# Patient Record
Sex: FEMALE | Race: WHITE | Employment: FULL TIME | URBAN - METROPOLITAN AREA
[De-identification: names, ages, dates, MRNs, and addresses within clinical notes are randomized per-mention and may not be internally consistent; named-entity substitution may affect disease eponyms.]

---
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
## 2018-04-19 ENCOUNTER — TELEPHONE (OUTPATIENT)
Dept: FAMILY MEDICINE | Facility: CLINIC | Age: 18
End: 2018-04-19

## 2018-04-19 ENCOUNTER — OFFICE VISIT (OUTPATIENT)
Dept: FAMILY MEDICINE | Facility: CLINIC | Age: 18
End: 2018-04-19
Payer: COMMERCIAL

## 2018-04-19 VITALS
HEART RATE: 80 BPM | TEMPERATURE: 98.8 F | DIASTOLIC BLOOD PRESSURE: 82 MMHG | BODY MASS INDEX: 37.38 KG/M2 | WEIGHT: 211 LBS | SYSTOLIC BLOOD PRESSURE: 120 MMHG | OXYGEN SATURATION: 100 %

## 2018-04-19 DIAGNOSIS — L21.9 SEBORRHEIC DERMATITIS: ICD-10-CM

## 2018-04-19 DIAGNOSIS — R21 RASH: Primary | ICD-10-CM

## 2018-04-19 LAB
KOH PREP SPEC: NORMAL
SPECIMEN SOURCE: NORMAL

## 2018-04-19 PROCEDURE — 87220 TISSUE EXAM FOR FUNGI: CPT | Performed by: INTERNAL MEDICINE

## 2018-04-19 PROCEDURE — 99213 OFFICE O/P EST LOW 20 MIN: CPT | Performed by: INTERNAL MEDICINE

## 2018-04-19 RX ORDER — FLUOCINONIDE TOPICAL SOLUTION USP, 0.05% 0.5 MG/ML
SOLUTION TOPICAL
Qty: 60 ML | Refills: 0 | Status: SHIPPED | OUTPATIENT
Start: 2018-04-19 | End: 2021-03-16

## 2018-04-19 RX ORDER — BETAMETHASONE DIPROPIONATE 0.5 MG/G
LOTION TOPICAL
Qty: 30 ML | Refills: 3 | Status: SHIPPED | OUTPATIENT
Start: 2018-04-19 | End: 2021-03-16

## 2018-04-19 RX ORDER — KETOCONAZOLE 20 MG/ML
SHAMPOO TOPICAL DAILY PRN
Qty: 100 ML | Refills: 3 | Status: SHIPPED | OUTPATIENT
Start: 2018-04-19 | End: 2021-03-16

## 2018-04-19 NOTE — MR AVS SNAPSHOT
"              After Visit Summary   2018    Felisha Santillan    MRN: [id number]           Patient Information     Date Of Birth          2000        Visit Information        Provider Department      2018 12:40 PM Heather Bentley MD [facility]        Today's Diagnoses     Rash    -  1    Seborrheic dermatitis           Follow-ups after your visit        Follow-up notes from your care team     Return if symptoms worsen or fail to improve.      Who to contact     If you have questions or need follow up information about today's clinic visit or your schedule please contact [facility] directly at [phone].  Normal or non-critical lab and imaging results will be communicated to you by MyChart, letter or phone within 4 business days after the clinic has received the results. If you do not hear from us within 7 days, please contact the clinic through MyChart or phone. If you have a critical or abnormal lab result, we will notify you by phone as soon as possible.  Submit refill requests through [employer] or call your pharmacy and they will forward the refill request to us. Please allow 3 business days for your refill to be completed.          Additional Information About Your Visit        MyChart Information     [employer] lets you send messages to your doctor, view your test results, renew your prescriptions, schedule appointments and more. To sign up, go to [email or web address] . Click on \"Log in\" on the left side of the screen, which will take you to the Welcome page. Then click on \"Sign up Now\" on the right side of the page.     You will be asked to enter the access code listed below, as well as some personal information. Please follow the directions to create your username and password.     Your access code is: [email or web address]  Expires: 2018  1:16 PM     Your access code will  in 90 days. If you need help or a new code, please call your [place] " [facility] or [phone].        Care EveryWhere ID     This is your Care EveryWhere ID. This could be used by other organizations to access your [place] medical records  [id number]        Your Vitals Were     Pulse Temperature Pulse Oximetry BMI (Body Mass Index)          80 98.8  F (37.1  C) (Tympanic) 100% 37.38 kg/m2         Blood Pressure from Last 3 Encounters:   04/19/18 120/82   12/20/17 125/79   10/24/17 110/70    Weight from Last 3 Encounters:   04/19/18 211 lb (95.7 kg)   12/20/17 195 lb 9.6 oz (88.7 kg) (97 %)*   10/24/17 197 lb (89.4 kg) (97 %)*     * Growth percentiles are based on [facility] 2-20 Years data.              We Performed the Following     KOH prep (skin, hair or nails only)          Today's Medication Changes          These changes are accurate as of 4/19/18  1:16 PM.  If you have any questions, ask your nurse or doctor.               Start taking these medicines.        Dose/Directions    betamethasone dipropionate 0.05 % lotion   Commonly known as:  DIPROSONE   Used for:  Rash   Started by:  Heather Bentley MD        Apply sparingly to affected area twice daily for 14 days.  Do not apply to face.   Quantity:  30 mL   Refills:  3       fluocinonide 0.05 % solution   Commonly known as:  LIDEX   Used for:  Seborrheic dermatitis   Started by:  Heather Bentley MD        Apply sparingly to affected area twice daily as needed.  Do not apply to face.   Quantity:  60 mL   Refills:  0       ketoconazole 2 % shampoo   Commonly known as:  NIZORAL   Used for:  Seborrheic dermatitis   Started by:  Heather Bentley MD        Apply topically daily as needed for itching or irritation Twice weekly for 2 to 4 weeks then apply once every 1 to 2 weeks   Quantity:  100 mL   Refills:  3            Where to get your medicines      These medications were sent to [facility] [address] [place] [facility] [address] [facility]  [address] [facility] [address]     Phone:  [phone]     betamethasone  dipropionate 0.05 % lotion    fluocinonide 0.05 % solution    ketoconazole 2 % shampoo                Primary Care Provider    None Specified       No primary provider on file.        Equal Access to Services     DEVEN LOUIS : Hadii aad ku hadcatalinadomi Jennaamandaali, chaparro harjindertobi, abhijit avalos, donya aaron laEleanorjanis ravinder. So [facility] [phone].    ATENCIÓN: Si habla español, tiene a martinez disposición servicios gratuitos de asistencia lingüística. Llame al [phone].    We comply with applicable federal civil rights laws and [place] laws. We do not discriminate on the basis of race, color, national origin, age, disability, sex, sexual orientation, or gender identity.            Thank you!     Thank you for choosing [facility]  for your care. Our goal is always to provide you with excellent care. Hearing back from our patients is one way we can continue to improve our services. Please take a few minutes to complete the written survey that you may receive in the mail after your visit with us. Thank you!             Your Updated Medication List - Protect others around you: Learn how to safely use, store and throw away your medicines at www.disposemymeds.org.          This list is accurate as of 4/19/18  1:16 PM.  Always use your most recent med list.                   Brand Name Dispense Instructions for use Diagnosis    betamethasone dipropionate 0.05 % lotion    DIPROSONE    30 mL    Apply sparingly to affected area twice daily for 14 days.  Do not apply to face.    Rash       fluocinonide 0.05 % solution    LIDEX    60 mL    Apply sparingly to affected area twice daily as needed.  Do not apply to face.    Seborrheic dermatitis       ketoconazole 2 % shampoo    NIZORAL    100 mL    Apply topically daily as needed for itching or irritation Twice weekly for 2 to 4 weeks then apply once every 1 to 2 weeks    Seborrheic dermatitis

## 2018-04-19 NOTE — NURSING NOTE
"Chief Complaint   Patient presents with     Derm Problem       Initial /82  Pulse 80  Temp 98.8  F (37.1  C) (Tympanic)  Wt 211 lb (95.7 kg)  SpO2 100%  BMI 37.38 kg/m2 Estimated body mass index is 37.38 kg/(m^2) as calculated from the following:    Height as of 12/20/17: 5' 3\" (1.6 m).    Weight as of this encounter: 211 lb (95.7 kg).  Medication Reconciliation: complete   Mally Lovell CMA      "

## 2018-04-19 NOTE — PROGRESS NOTES
SUBJECTIVE:   Felisha Santillan is a 18 year old female who presents to clinic today for the following health issues:      Rash   Scalp - lots of flaking, itchy, some more inflamed and flaky patches that are very itchy.  Trying OTC shampoos but they are not helpful.  At a visit in December she was prescribed Nizoral, but apparently never got this rx.  She tried the clotrimazole but that was hard to put on her hair.    RLE - round itchy rash has been there for a few months, last evaluated in December.  Tried the clotrimazole cream for a couple weeks, but that didn't help.          Reviewed and updated as needed this visit by clinical staff  Tobacco  Allergies  Meds  Med Hx  Surg Hx  Fam Hx  Soc Hx      Reviewed and updated as needed this visit by Provider         ROS:  Skin reviewed,  otherwise negative unless noted above.       OBJECTIVE:     /82  Pulse 80  Temp 98.8  F (37.1  C) (Tympanic)  Wt 211 lb (95.7 kg)  SpO2 100%  BMI 37.38 kg/m2  Body mass index is 37.38 kg/(m^2).    Gen: pleasant, well appearing young woman, no distress  Skin: scalp with significant dandruff.  More erythematous, scaling patches behind ears.  ~10cm erythematous dry, scaling patch on lateral right lower leg    Diagnostic Test Results:  Results for orders placed or performed in visit on 04/19/18 (from the past 24 hour(s))   KOH prep (skin, hair or nails only)   Result Value Ref Range    Specimen Description Skin     KOH Skin Hair Nails Test No fungal elements seen        ASSESSMENT/PLAN:     1. Rash  Leg rash, does have the appearance of ringworm, but KOH negative and clotrimazole did not help.  Will try topical steroid.   - KOH prep (skin, hair or nails only)  - betamethasone dipropionate (DIPROSONE) 0.05 % lotion; Apply sparingly to affected area twice daily for 14 days.  Do not apply to face.  Dispense: 30 mL; Refill: 3    2. Seborrheic dermatitis  Reordered ketoconazole shampoo. Can use lidex solution as needed for  bad patches.   - ketoconazole (NIZORAL) 2 % shampoo; Apply topically daily as needed for itching or irritation Twice weekly for 2 to 4 weeks then apply once every 1 to 2 weeks  Dispense: 100 mL; Refill: 3  - fluocinonide (LIDEX) 0.05 % solution; Apply sparingly to affected area twice daily as needed.  Do not apply to face.  Dispense: 60 mL; Refill: 0    She plans to follow up soon to discuss other options for birth control, does not want to continue with depo.     Heather Bentley MD  [facility]

## 2018-05-09 ENCOUNTER — TRANSFERRED RECORDS (OUTPATIENT)
Dept: HEALTH INFORMATION MANAGEMENT | Facility: CLINIC | Age: 18
End: 2018-05-09

## 2018-05-10 ENCOUNTER — OFFICE VISIT (OUTPATIENT)
Dept: FAMILY MEDICINE | Facility: CLINIC | Age: 18
End: 2018-05-10
Payer: COMMERCIAL

## 2018-05-10 VITALS
HEIGHT: 63 IN | OXYGEN SATURATION: 97 % | WEIGHT: 215 LBS | RESPIRATION RATE: 14 BRPM | SYSTOLIC BLOOD PRESSURE: 126 MMHG | DIASTOLIC BLOOD PRESSURE: 86 MMHG | HEART RATE: 92 BPM | BODY MASS INDEX: 38.09 KG/M2 | TEMPERATURE: 98.7 F

## 2018-05-10 DIAGNOSIS — S70.01XD CONTUSION OF RIGHT HIP, SUBSEQUENT ENCOUNTER: Primary | ICD-10-CM

## 2018-05-10 DIAGNOSIS — S50.01XD CONTUSION OF RIGHT ELBOW, SUBSEQUENT ENCOUNTER: ICD-10-CM

## 2018-05-10 DIAGNOSIS — R21 RASH AND NONSPECIFIC SKIN ERUPTION: ICD-10-CM

## 2018-05-10 DIAGNOSIS — M62.838 MUSCLE SPASM: ICD-10-CM

## 2018-05-10 DIAGNOSIS — L21.9 SEBORRHEIC DERMATITIS: ICD-10-CM
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
## 2018-05-10 PROCEDURE — 99213 OFFICE O/P EST LOW 20 MIN: CPT | Performed by: PHYSICIAN ASSISTANT

## 2018-05-10 RX ORDER — CYCLOBENZAPRINE HCL 10 MG
10 TABLET ORAL 3 TIMES DAILY PRN
Qty: 30 TABLET | Refills: 0 | Status: SHIPPED | OUTPATIENT
Start: 2018-05-10 | End: 2021-03-16

## 2018-05-10 RX ORDER — IBUPROFEN 800 MG/1
800 TABLET, FILM COATED ORAL EVERY 8 HOURS PRN
Qty: 30 TABLET | Refills: 0 | Status: SHIPPED | OUTPATIENT
Start: 2018-05-10 | End: 2021-03-16

## 2018-05-10 RX ORDER — NAPROXEN 500 MG/1
500 TABLET ORAL
COMMUNITY
Start: 2018-05-09 | End: 2018-05-10

## 2018-05-10 NOTE — PROGRESS NOTES
"  SUBJECTIVE:   Felisha Santillan is a 18 year old female who presents to clinic today for the following health issues:    ED/UC Followup:    Facility:  [facility]  Date of visit: 05/09/2018  Reason for visit: Fall injury rt hip and elbow  Current Status: Pain is 9/10 pt is feeling worse and reported the naproxen medication is not helping. Rt wrist is starting to hurt now too.      Felisha fell onto her right elbow while gardening yesterday, landing on right elbow and hip. Her pain at the time was severe and so she went to the ED for evaluation. X ray of right hip and elbow were completed and were negative for acute fracture ( records reviewed).   She was given naprosyn which has not been helping her pain. Today, she began to feel more pain in her right shoulder and wrist that were not present just after the fall.  She is feeling a \" pulling\" sensation at her elbow and some mild shoulder and arm paresthesias.  No neck pain or injury.      Patient continued to have seborrheic dermatitis of the scalp as well as a erythematous rash on her right shin that has been constant x months.  She has tried clotrimazole cream and diprolene cream without improvement for her shin, has tried Nizoral shampoo and cream as well as Elidel for her scalp, neither of which are helping.       Problem list and histories reviewed & adjusted, as indicated.  Additional history: as documented    Patient Active Problem List   Diagnosis     Non morbid obesity     Foot sprain     CARDIOVASCULAR SCREENING; LDL GOAL LESS THAN 160     Enlarged thyroid gland     Gonorrhea in female     Past Surgical History:   Procedure Laterality Date     NO HISTORY OF SURGERY         Social History   Substance Use Topics     Smoking status: Never Smoker     Smokeless tobacco: Never Used     Alcohol use No     Family History   Problem Relation Age of Onset     Obesity Mother      DIABETES Father      Psychotic Disorder Maternal Grandmother      eating " "disorder and depression     Family History Negative Sister      Family History Negative Sister      Breast Cancer No family hx of      Colon Cancer No family hx of          Current Outpatient Prescriptions   Medication Sig Dispense Refill     cyclobenzaprine (FLEXERIL) 10 MG tablet Take 1 tablet (10 mg) by mouth 3 times daily as needed for muscle spasms 30 tablet 0     fluocinonide (LIDEX) 0.05 % solution Apply sparingly to affected area twice daily as needed.  Do not apply to face. 60 mL 0     ibuprofen (ADVIL/MOTRIN) 800 MG tablet Take 1 tablet (800 mg) by mouth every 8 hours as needed for moderate pain 30 tablet 0     ketoconazole (NIZORAL) 2 % shampoo Apply topically daily as needed for itching or irritation Twice weekly for 2 to 4 weeks then apply once every 1 to 2 weeks 100 mL 3     betamethasone dipropionate (DIPROSONE) 0.05 % lotion Apply sparingly to affected area twice daily for 14 days.  Do not apply to face. (Patient not taking: Reported on 5/10/2018) 30 mL 3     Allergies   Allergen Reactions     No Known Allergies        Reviewed and updated as needed this visit by clinical staff       Reviewed and updated as needed this visit by Provider         ROS:  Constitutional, HEENT, cardiovascular, pulmonary, GI, , musculoskeletal, neuro, skin, endocrine and psych systems are negative, except as otherwise noted.    OBJECTIVE:     /86  Pulse 92  Temp 98.7  F (37.1  C) (Tympanic)  Resp 14  Ht 5' 3\" (1.6 m)  Wt 215 lb (97.5 kg)  SpO2 97%  BMI 38.09 kg/m2  Body mass index is 38.09 kg/(m^2).  GENERAL: healthy, alert and no distress  MS: diffuse TTP along right shoulder, arm, elbow and wrist without deformity noted, no focal edema noted.  Active and passive ROM of right wrist, elbow and shoulder are limited due to pain and poor effort, 45 strength of RUE due to pain.  Patient is seen using arm fully when not being examined  NEURO: Normal strength and tone, mentation intact and speech normal  PSYCH: " mentation appears normal, affect normal/bright    Diagnostic Test Results:  none     ASSESSMENT/PLAN:     1. Fall, subsequent encounter    2. Contusion of right hip, subsequent encounter  Improving, no concerns today  - ibuprofen (ADVIL/MOTRIN) 800 MG tablet; Take 1 tablet (800 mg) by mouth every 8 hours as needed for moderate pain  Dispense: 30 tablet; Refill: 0    3. Contusion of right elbow, subsequent encounter  Patient examination is limited by pain and poor effort today.  When distracted, she is able to straighten and lift arm at elbow and lift arm at shoulder, although she will not due this when directed.  I suspect that right shoulder and wrist pain are due to contusion, pain is diffuse, not well localized, and I am not concerned for acute shoulder fracture or wrist fracture today.  I have advised that she try and stretch her arm gently over the next few days as she is starting to feel her elbow becoming stiff with the sling, and she will change from naprosyn to ibuprofen every 8 hours with food.  Small script for flexeril given for shoulder and elbow pain related to stiffness and spasm.  She may use this TID PRN.   - ibuprofen (ADVIL/MOTRIN) 800 MG tablet; Take 1 tablet (800 mg) by mouth every 8 hours as needed for moderate pain  Dispense: 30 tablet; Refill: 0    4. Muscle spasm  - cyclobenzaprine (FLEXERIL) 10 MG tablet; Take 1 tablet (10 mg) by mouth 3 times daily as needed for muscle spasms  Dispense: 30 tablet; Refill: 0    5. Seborrheic dermatitis  Requesting derm referral  - SKIN CARE REFERRAL    6. Rash and nonspecific skin eruption  Derm referral given  - SKIN CARE REFERRAL    See Patient Instructions    Graeme Thompson PA-C  [facility]

## 2018-05-10 NOTE — MR AVS SNAPSHOT
After Visit Summary   5/10/2018    Felisha Santillan    MRN: [id number]           Patient Information     Date Of Birth          2000        Visit Information        Provider Department      5/10/2018 4:00 PM Graeme Thompson PA-C [facility] [place]        Today's Diagnoses     Contusion of right hip, subsequent encounter    -  1    Fall, subsequent encounter        Contusion of right elbow, subsequent encounter        Muscle spasm        Seborrheic dermatitis        Rash and nonspecific skin eruption           Follow-ups after your visit        Additional Services     SKIN CARE REFERRAL       Your provider has referred you to: [facility]: [facility]  [place] [phone]  [email or web address]     Please be aware that coverage of these services is subject to the terms and limitations of your health insurance plan.  Please check with your insurance prior to the appointment to ensure appropriate coverage for any services considered cosmetic in nature or not medically necessary.    Please bring the following with you to your appointment:    (1) Any X-Rays, CTs or MRIs which have been performed.  Contact the facility where they were done to arrange for  prior to your scheduled appointment.  Any new CT, MRI or other procedures ordered by your specialist must be performed at a [place] facility or coordinated by your clinic's referral office.  (2) List of current medications  (3) This referral request   (4) Any documents/labs given to you for this referral                  Follow-up notes from your care team     Return in about 2 weeks (around 5/24/2018) for if persistent symptoms.      Who to contact     If you have questions or need follow up information about today's clinic visit or your schedule please contact [facility] [place] directly at [phone].  Normal or non-critical lab and imaging results will be  "communicated to you by BrightQubehart, letter or phone within 4 business days after the clinic has received the results. If you do not hear from us within 7 days, please contact the clinic through StoryPress or phone. If you have a critical or abnormal lab result, we will notify you by phone as soon as possible.  Submit refill requests through StoryPress or call your pharmacy and they will forward the refill request to us. Please allow 3 business days for your refill to be completed.          Additional Information About Your Visit        StoryPress Information     StoryPress lets you send messages to your doctor, view your test results, renew your prescriptions, schedule appointments and more. To sign up, go to [email or web address] . Click on \"Log in\" on the left side of the screen, which will take you to the Welcome page. Then click on \"Sign up Now\" on the right side of the page.     You will be asked to enter the access code listed below, as well as some personal information. Please follow the directions to create your username and password.     Your access code is: [email or web address]  Expires: 2018  1:16 PM     Your access code will  in 90 days. If you need help or a new code, please call your [place] clinic or [phone].        Care EveryWhere ID     This is your Care EveryWhere ID. This could be used by other organizations to access your [place] medical records  [id number]        Your Vitals Were     Pulse Temperature Respirations Height Pulse Oximetry BMI (Body Mass Index)    92 98.7  F (37.1  C) (Tympanic) 14 5' 3\" (1.6 m) 97% 38.09 kg/m2       Blood Pressure from Last 3 Encounters:   05/10/18 126/86   18 120/82   17 125/79    Weight from Last 3 Encounters:   05/10/18 215 lb (97.5 kg)   18 211 lb (95.7 kg)   17 195 lb 9.6 oz (88.7 kg) (97 %)*     * Growth percentiles are based on CDC 2-20 Years data.              We Performed the Following     SKIN CARE REFERRAL          Today's " Medication Changes          These changes are accurate as of 5/10/18  4:49 PM.  If you have any questions, ask your nurse or doctor.               Start taking these medicines.        Dose/Directions    cyclobenzaprine 10 MG tablet   Commonly known as:  FLEXERIL   Used for:  Muscle spasm   Started by:  Graeme Thompson PA-C        Dose:  10 mg   Take 1 tablet (10 mg) by mouth 3 times daily as needed for muscle spasms   Quantity:  30 tablet   Refills:  0       ibuprofen 800 MG tablet   Commonly known as:  ADVIL/MOTRIN   Used for:  Contusion of right hip, subsequent encounter, Contusion of right elbow, subsequent encounter   Started by:  Graeme Thompson PA-C        Dose:  800 mg   Take 1 tablet (800 mg) by mouth every 8 hours as needed for moderate pain   Quantity:  30 tablet   Refills:  0         Stop taking these medicines if you haven't already. Please contact your care team if you have questions.     naproxen 500 MG tablet   Commonly known as:  NAPROSYN   Stopped by:  Graeme Thompson PA-C                Where to get your medicines      These medications were sent to [facility] [address] [place] [address] [place], [place] - [address] [place] [address] [address], [place] [address]     Phone:  [phone]     cyclobenzaprine 10 MG tablet    ibuprofen 800 MG tablet                Primary Care Provider Fax #    Provider Not In System [phone]                Equal Access to Services     [facility]: Hadii braulio contreraso Sonick, waaxda luqadaha, qaybta kaalmada rufinoda, donya tristan . So [facility] [phone].    ATENCIÓN: Si habla español, tiene a martinez disposición servicios gratuitos de asistencia lingüística. Llame al [phone].    We comply with applicable federal civil rights laws and [place] laws. We do not discriminate on the basis of race, color, national origin, age, disability, sex, sexual orientation, or gender identity.            Thank  you!     Thank you for choosing [facility] [place]  for your care. Our goal is always to provide you with excellent care. Hearing back from our patients is one way we can continue to improve our services. Please take a few minutes to complete the written survey that you may receive in the mail after your visit with us. Thank you!             Your Updated Medication List - Protect others around you: Learn how to safely use, store and throw away your medicines at www.disposemymeds.org.          This list is accurate as of 5/10/18  4:49 PM.  Always use your most recent med list.                   Brand Name Dispense Instructions for use Diagnosis    betamethasone dipropionate 0.05 % lotion    DIPROSONE    30 mL    Apply sparingly to affected area twice daily for 14 days.  Do not apply to face.    Rash       cyclobenzaprine 10 MG tablet    FLEXERIL    30 tablet    Take 1 tablet (10 mg) by mouth 3 times daily as needed for muscle spasms    Muscle spasm       fluocinonide 0.05 % solution    LIDEX    60 mL    Apply sparingly to affected area twice daily as needed.  Do not apply to face.    Seborrheic dermatitis       ibuprofen 800 MG tablet    ADVIL/MOTRIN    30 tablet    Take 1 tablet (800 mg) by mouth every 8 hours as needed for moderate pain    Contusion of right hip, subsequent encounter, Contusion of right elbow, subsequent encounter       ketoconazole 2 % shampoo    NIZORAL    100 mL    Apply topically daily as needed for itching or irritation Twice weekly for 2 to 4 weeks then apply once every 1 to 2 weeks    Seborrheic dermatitis

## 2019-01-21 ENCOUNTER — OFFICE VISIT (OUTPATIENT)
Dept: FAMILY MEDICINE | Facility: CLINIC | Age: 19
End: 2019-01-21
Payer: COMMERCIAL

## 2019-01-21 VITALS
OXYGEN SATURATION: 98 % | TEMPERATURE: 97.5 F | HEART RATE: 100 BPM | WEIGHT: 242 LBS | SYSTOLIC BLOOD PRESSURE: 98 MMHG | HEIGHT: 63 IN | BODY MASS INDEX: 42.88 KG/M2 | DIASTOLIC BLOOD PRESSURE: 64 MMHG

## 2019-01-21 DIAGNOSIS — J02.9 SORE THROAT: Primary | ICD-10-CM

## 2019-01-21 DIAGNOSIS — J02.9 VIRAL PHARYNGITIS: ICD-10-CM

## 2019-01-21 LAB
BASOPHILS # BLD AUTO: 0 10E9/L (ref 0–0.2)
BASOPHILS NFR BLD AUTO: 0 %
DIFFERENTIAL METHOD BLD: NORMAL
EOSINOPHIL # BLD AUTO: 0.1 10E9/L (ref 0–0.7)
EOSINOPHIL NFR BLD AUTO: 1.1 %
ERYTHROCYTE [DISTWIDTH] IN BLOOD BY AUTOMATED COUNT: 13.3 % (ref 10–15)
HCT VFR BLD AUTO: 40.7 % (ref 35–47)
HETEROPH AB SER QL: NEGATIVE
HGB BLD-MCNC: 13.2 G/DL (ref 11.7–15.7)
LYMPHOCYTES # BLD AUTO: 2.8 10E9/L (ref 0.8–5.3)
LYMPHOCYTES NFR BLD AUTO: 42.2 %
MCH RBC QN AUTO: 27.8 PG (ref 26.5–33)
MCHC RBC AUTO-ENTMCNC: 32.4 G/DL (ref 31.5–36.5)
MCV RBC AUTO: 86 FL (ref 78–100)
MONOCYTES # BLD AUTO: 0.4 10E9/L (ref 0–1.3)
MONOCYTES NFR BLD AUTO: 6 %
NEUTROPHILS # BLD AUTO: 3.3 10E9/L (ref 1.6–8.3)
NEUTROPHILS NFR BLD AUTO: 50.7 %
PLATELET # BLD AUTO: 267 10E9/L (ref 150–450)
RBC # BLD AUTO: 4.75 10E12/L (ref 3.8–5.2)
WBC # BLD AUTO: 6.5 10E9/L (ref 4–11)

## 2019-01-21 PROCEDURE — 99213 OFFICE O/P EST LOW 20 MIN: CPT | Performed by: FAMILY MEDICINE

## 2019-01-21 PROCEDURE — 86308 HETEROPHILE ANTIBODY SCREEN: CPT | Performed by: FAMILY MEDICINE

## 2019-01-21 PROCEDURE — 36415 COLL VENOUS BLD VENIPUNCTURE: CPT | Performed by: FAMILY MEDICINE

## 2019-01-21 PROCEDURE — 85025 COMPLETE CBC W/AUTO DIFF WBC: CPT | Performed by: FAMILY MEDICINE

## 2019-01-21 RX ORDER — AMOXICILLIN AND CLAVULANATE POTASSIUM 400; 57 MG/5ML; MG/5ML
600 POWDER, FOR SUSPENSION ORAL
COMMUNITY
Start: 2019-01-19 | End: 2021-03-16

## 2019-01-21 ASSESSMENT — MIFFLIN-ST. JEOR: SCORE: 1831.83

## 2019-01-21 NOTE — PROGRESS NOTES
SUBJECTIVE:                                                    Felisha Santillan is a 19 year old female who presents to clinic today for the following health issues:        Acute Illness   Acute illness concerns: sore throat  Onset: 4 days    Fever: no    Chills/Sweats: no    Headache (location?): no    Sinus Pressure:YES    Conjunctivitis:  no    Ear Pain: YES: both    Rhinorrhea: no    Congestion: no    Sore Throat: YES     Cough: YES-productive of unknown color sputum    Wheeze: YES- at night    Decreased Appetite: YES    Nausea: no    Vomiting: no    Diarrhea:  no    Dysuria/Freq.: no    Fatigue/Achiness: YES    Sick/Strep Exposure: no     Therapies Tried and outcome: Augmentin, ibuprofen    Felisha was seen at [facility] ED for sore throat and difficulty swallowing a couple days ago. Rapid strep, influenza testing negative. XR neck obtained and demonstrated normal epiglottis, normal retropharyngeal soft tissues. She was treated with oral Decadron and discharged on Augmentin.    Still having sore throat. No difficulty swallowing but having pain. She is taking the antibiotic as well as ibuprofen. Tried salt water gargles, throat lozenges, Chloraseptic spray.    Problem list and histories reviewed & adjusted, as indicated.  Additional history: as documented    Patient Active Problem List   Diagnosis     Non morbid obesity     Foot sprain     CARDIOVASCULAR SCREENING; LDL GOAL LESS THAN 160     Enlarged thyroid gland     Gonorrhea in female     Past Surgical History:   Procedure Laterality Date     NO HISTORY OF SURGERY         Social History     Tobacco Use     Smoking status: Never Smoker     Smokeless tobacco: Never Used   Substance Use Topics     Alcohol use: No     Alcohol/week: 0.0 oz     Family History   Problem Relation Age of Onset     Obesity Mother      Diabetes Father      Psychotic Disorder Maternal Grandmother         eating disorder and depression     Family History Negative Sister      Family  "History Negative Sister      Breast Cancer No family hx of      Colon Cancer No family hx of            ROS:  Constitutional, HEENT, cardiovascular, pulmonary, gi and gu systems are negative, except as otherwise noted.    OBJECTIVE:     BP 98/64   Pulse 100   Temp 97.5  F (36.4  C) (Tympanic)   Ht 1.6 m (5' 3\")   Wt 109.8 kg (242 lb)   SpO2 98%   BMI 42.87 kg/m    Body mass index is 42.87 kg/m .  GENERAL: healthy, alert and no distress  EYES: Eyes grossly normal to inspection, PERRL and conjunctivae and sclerae normal  HENT: ear canals and TM's normal, nose and mouth without ulcers or lesions  NECK: no adenopathy and no asymmetry, masses, or scars  RESP: lungs clear to auscultation - no rales, rhonchi or wheezes  CV: regular rate and rhythm, normal S1 S2, no S3 or S4, no murmur, click or rub, no peripheral edema and peripheral pulses strong  SKIN: no suspicious lesions or rashes    Diagnostic Test Results:  Mono -  Negative  CBC - normal    ASSESSMENT/PLAN:   1. Sore throat    - CBC with platelets and differential  - Mononucleosis screen    2. Viral pharyngitis: symptoms likely vial. Continue symptomatic treatments. Can try humidifier to help as her throat is more sore at night. Follow up if any worsening of pain or difficulty swallowing.    Victor M Aldana, DO  [facility] [place]      "

## 2019-09-16 ENCOUNTER — TELEPHONE (OUTPATIENT)
Dept: FAMILY MEDICINE | Facility: CLINIC | Age: 19
End: 2019-09-16

## 2019-09-16 NOTE — LETTER
[facility]  [address]  [place] [id number] [phone]  September 16, 2019    Felisha Santillan  [address] [place] [id number]    Dear Felisha,    I care about your health and have reviewed your health plan. I have reviewed your medical conditions, medication list, and lab results and am making recommendations based on this review, to better manage your health.    You are in particular need of attention regarding:  -Cervical Cancer Screening  -Wellness (Physical) Visit     I am recommending that you:  -schedule a WELLNESS (Physical) APPOINTMENT with me.   I will check fasting labs the same day - nothing to eat except water and meds for 8-10 hours prior.    -schedule a PAP SMEAR EXAM which is due.  Please disregard this reminder if you have had this exam elsewhere within the last year.  It would be helpful for us to have a copy of your recent pap smear report in our file so that we can best coordinate your care.    If you are under/uninsured, we recommend you contact the [facility]. They offer pap smears at no charge or on a sliding fee charge. You can schedule with them at [phone]. Please have them send us the results.      Here is a list of Health Maintenance topics that are due now or due soon:  Health Maintenance Due   Topic Date Due     PAP  2000     HIV SCREENING  01/06/2013     PREVENTIVE CARE VISIT  06/30/2018     CHLAMYDIA SCREENING  10/24/2018     PHQ-2  01/01/2019     INFLUENZA VACCINE (1) 09/01/2019       Please call us at [phone] (or use [employer]) to address the above recommendations.     Thank you for trusting [facility] and we appreciate the opportunity to serve you.  We look forward to supporting your healthcare needs in the future.    Healthy Regards,    [facility] Care Team      We now have an OB/GYN provider on site! You may schedule with Deneen FONTENOT CNM  by calling [phone].

## 2019-09-16 NOTE — TELEPHONE ENCOUNTER
Needs of attention regarding:  -Cervical Cancer Screening  -Wellness (Physical) Visit     Health Maintenance Topics with due status: Overdue       Topic Date Due    PAP 2000    HIV SCREENING 01/06/2013    PREVENTIVE CARE VISIT 06/30/2018    CHLAMYDIA SCREENING 10/24/2018    PHQ-2 01/01/2019    INFLUENZA VACCINE 09/01/2019       Communication:  See Letter

## 2021-03-16 ENCOUNTER — OFFICE VISIT (OUTPATIENT)
Dept: FAMILY MEDICINE | Facility: CLINIC | Age: 21
End: 2021-03-16
Payer: COMMERCIAL

## 2021-03-16 VITALS
SYSTOLIC BLOOD PRESSURE: 126 MMHG | DIASTOLIC BLOOD PRESSURE: 73 MMHG | BODY MASS INDEX: 41.11 KG/M2 | WEIGHT: 232 LBS | TEMPERATURE: 97.7 F | HEIGHT: 63 IN | HEART RATE: 115 BPM | OXYGEN SATURATION: 99 %

## 2021-03-16 DIAGNOSIS — Z13.21 ENCOUNTER FOR VITAMIN DEFICIENCY SCREENING: ICD-10-CM

## 2021-03-16 DIAGNOSIS — E66.01 CLASS 3 SEVERE OBESITY WITHOUT SERIOUS COMORBIDITY WITH BODY MASS INDEX (BMI) OF 40.0 TO 44.9 IN ADULT, UNSPECIFIED OBESITY TYPE (H): ICD-10-CM

## 2021-03-16 DIAGNOSIS — Z12.4 SCREENING FOR MALIGNANT NEOPLASM OF CERVIX: ICD-10-CM

## 2021-03-16 DIAGNOSIS — E66.813 CLASS 3 SEVERE OBESITY WITHOUT SERIOUS COMORBIDITY WITH BODY MASS INDEX (BMI) OF 40.0 TO 44.9 IN ADULT, UNSPECIFIED OBESITY TYPE (H): ICD-10-CM

## 2021-03-16 DIAGNOSIS — Z13.1 SCREENING FOR DIABETES MELLITUS: ICD-10-CM

## 2021-03-16 DIAGNOSIS — A74.9 CHLAMYDIA INFECTION: ICD-10-CM

## 2021-03-16 DIAGNOSIS — Z13.0 SCREENING FOR DEFICIENCY ANEMIA: ICD-10-CM

## 2021-03-16 DIAGNOSIS — Z00.00 ROUTINE GENERAL MEDICAL EXAMINATION AT A HEALTH CARE FACILITY: Primary | ICD-10-CM

## 2021-03-16 DIAGNOSIS — Z11.3 SCREENING FOR STDS (SEXUALLY TRANSMITTED DISEASES): ICD-10-CM

## 2021-03-16 DIAGNOSIS — Z13.220 SCREENING CHOLESTEROL LEVEL: ICD-10-CM

## 2021-03-16 DIAGNOSIS — Z13.29 THYROID DISORDER SCREENING: ICD-10-CM

## 2021-03-16 DIAGNOSIS — Z11.8 SPECIAL SCREENING EXAMINATION FOR CHLAMYDIAL DISEASE: ICD-10-CM

## 2021-03-16 LAB
ERYTHROCYTE [DISTWIDTH] IN BLOOD BY AUTOMATED COUNT: 13.7 % (ref 10–15)
HCT VFR BLD AUTO: 38.9 % (ref 35–47)
HGB BLD-MCNC: 12.7 G/DL (ref 11.7–15.7)
MCH RBC QN AUTO: 27.1 PG (ref 26.5–33)
MCHC RBC AUTO-ENTMCNC: 32.6 G/DL (ref 31.5–36.5)
MCV RBC AUTO: 83 FL (ref 78–100)
PLATELET # BLD AUTO: 293 10E9/L (ref 150–450)
RBC # BLD AUTO: 4.69 10E12/L (ref 3.8–5.2)
WBC # BLD AUTO: 8.3 10E9/L (ref 4–11)

## 2021-03-16 PROCEDURE — 82306 VITAMIN D 25 HYDROXY: CPT | Performed by: NURSE PRACTITIONER

## 2021-03-16 PROCEDURE — 80061 LIPID PANEL: CPT | Performed by: NURSE PRACTITIONER

## 2021-03-16 PROCEDURE — 87591 N.GONORRHOEAE DNA AMP PROB: CPT | Performed by: NURSE PRACTITIONER

## 2021-03-16 PROCEDURE — 80053 COMPREHEN METABOLIC PANEL: CPT | Performed by: NURSE PRACTITIONER

## 2021-03-16 PROCEDURE — 99385 PREV VISIT NEW AGE 18-39: CPT | Performed by: NURSE PRACTITIONER

## 2021-03-16 PROCEDURE — 84443 ASSAY THYROID STIM HORMONE: CPT | Performed by: NURSE PRACTITIONER

## 2021-03-16 PROCEDURE — 36415 COLL VENOUS BLD VENIPUNCTURE: CPT | Performed by: NURSE PRACTITIONER

## 2021-03-16 PROCEDURE — 85027 COMPLETE CBC AUTOMATED: CPT | Performed by: NURSE PRACTITIONER

## 2021-03-16 PROCEDURE — 87491 CHLMYD TRACH DNA AMP PROBE: CPT | Performed by: NURSE PRACTITIONER

## 2021-03-16 PROCEDURE — G0145 SCR C/V CYTO,THINLAYER,RESCR: HCPCS | Performed by: NURSE PRACTITIONER

## 2021-03-16 ASSESSMENT — ANXIETY QUESTIONNAIRES
5. BEING SO RESTLESS THAT IT IS HARD TO SIT STILL: NOT AT ALL
2. NOT BEING ABLE TO STOP OR CONTROL WORRYING: NOT AT ALL
IF YOU CHECKED OFF ANY PROBLEMS ON THIS QUESTIONNAIRE, HOW DIFFICULT HAVE THESE PROBLEMS MADE IT FOR YOU TO DO YOUR WORK, TAKE CARE OF THINGS AT HOME, OR GET ALONG WITH OTHER PEOPLE: NOT DIFFICULT AT ALL
7. FEELING AFRAID AS IF SOMETHING AWFUL MIGHT HAPPEN: NOT AT ALL
1. FEELING NERVOUS, ANXIOUS, OR ON EDGE: NOT AT ALL
6. BECOMING EASILY ANNOYED OR IRRITABLE: NOT AT ALL
GAD7 TOTAL SCORE: 0
3. WORRYING TOO MUCH ABOUT DIFFERENT THINGS: NOT AT ALL

## 2021-03-16 ASSESSMENT — PATIENT HEALTH QUESTIONNAIRE - PHQ9
SUM OF ALL RESPONSES TO PHQ QUESTIONS 1-9: 1
5. POOR APPETITE OR OVEREATING: NOT AT ALL

## 2021-03-16 ASSESSMENT — MIFFLIN-ST. JEOR: SCORE: 1776.48

## 2021-03-16 NOTE — PROGRESS NOTES
SUBJECTIVE:   CC: Felisha Santillan is an 21 year old woman who presents for preventive health visit.       Patient has been advised of split billing requirements and indicates understanding: Yes  Healthy Habits:    Do you get at least three servings of calcium containing foods daily (dairy, green leafy vegetables, etc.)? yes    Amount of exercise or daily activities, outside of work: none outside of work    Problems taking medications regularly No    Medication side effects: No    Have you had an eye exam in the past two years? yes    Do you see a dentist twice per year? yes    Do you have sleep apnea, excessive snoring or daytime drowsiness?no      History of painful periods, managing ok now. Has been on OCP and depo in past with varied affect. Not sure if she wants to go back on at this point or not.     Today's PHQ-2 Score:   PHQ-2 ( 1999 [employer]) 5/10/2018 12/20/2017   Q1: Little interest or pleasure in doing things 0 0   Q2: Feeling down, depressed or hopeless 0 0   PHQ-2 Score 0 0       Abuse: Current or Past(Physical, Sexual or Emotional)- No  Do you feel safe in your environment? Yes    Have you ever done Advance Care Planning? (For example, a Health Directive, POLST, or a discussion with a medical provider or your loved ones about your wishes): No, advance care planning information given to patient to review.  Advanced care planning was discussed at today's visit.    Social History     Tobacco Use     Smoking status: Never Smoker     Smokeless tobacco: Never Used   Substance Use Topics     Alcohol use: No     Alcohol/week: 0.0 standard drinks     If you drink alcohol do you typically have >3 drinks per day or >7 drinks per week? No                     Reviewed orders with patient.  Reviewed health maintenance and updated orders accordingly - Yes  Lab work is in process        Pertinent mammograms are reviewed under the imaging tab.  History of abnormal Pap smear: NO - age 21-29 PAP every 3 years  recommended     Reviewed and updated as needed this visit by clinical staff                 Reviewed and updated as needed this visit by Provider                Past Medical History:   Diagnosis Date     obesity       Past Surgical History:   Procedure Laterality Date     NO HISTORY OF SURGERY         ROS:  Constitutional, HEENT, cardiovascular, pulmonary, GI, , musculoskeletal, neuro, skin, endocrine and psych systems are negative, except as otherwise noted.      OBJECTIVE:   There were no vitals taken for this visit.  EXAM:  GENERAL: healthy, alert and no distress  EYES: Eyes grossly normal to inspection, PERRL and conjunctivae and sclerae normal  HENT: ear canals and TM's normal, nose and mouth without ulcers or lesions  NECK: no adenopathy, no asymmetry, masses, or scars and thyroid normal to palpation  RESP: lungs clear to auscultation - no rales, rhonchi or wheezes  BREAST: normal without masses, tenderness or nipple discharge and no palpable axillary masses or adenopathy  CV: regular rate and rhythm, normal S1 S2, no S3 or S4, no murmur, click or rub, no peripheral edema and peripheral pulses strong  ABDOMEN: soft, nontender, no hepatosplenomegaly, no masses and bowel sounds normal   (female): normal female external genitalia, normal urethral meatus, vaginal mucosa pink, moist, well rugated, and normal cervix/adnexa/uterus without masses or discharge  MS: no gross musculoskeletal defects noted, no edema  SKIN: no suspicious lesions or rashes  NEURO: Normal strength and tone, mentation intact and speech normal  PSYCH: mentation appears normal, affect normal/bright    Diagnostic Test Results:  Labs reviewed in Epic    ASSESSMENT/PLAN:   1. Routine general medical examination at a health care facility    2. Screening for STDs (sexually transmitted diseases)  - NEISSERIA GONORRHOEA PCR  - CHLAMYDIA TRACHOMATIS PCR    3. Screening for malignant neoplasm of cervix  - Pap imaged thin layer screen only -  "recommended age 21 - 24 years    4. Screening for diabetes mellitus  - COMPREHENSIVE METABOLIC PANEL (Today)    5. Thyroid disorder screening  - TSH WITH FREE T4 REFLEX    6. Screening for deficiency anemia  - CBC with Platelets      7. Screening cholesterol level  - Lipid panel reflex to direct LDL Fasting    8. Encounter for vitamin deficiency screening  - Vitamin D Deficiency    9. Class 3 severe obesity without serious comorbidity with body mass index (BMI) of 40.0 to 44.9 in adult, unspecified obesity type (H)  Continue to work on healthy diet habits.       Patient has been advised of split billing requirements and indicates understanding: Yes  COUNSELING:   Reviewed preventive health counseling, as reflected in patient instructions    Estimated body mass index is 42.87 kg/m  as calculated from the following:    Height as of 1/21/19: 1.6 m (5' 3\").    Weight as of 1/21/19: 109.8 kg (242 lb).    Weight management plan: Discussed healthy diet and exercise guidelines    She reports that she has never smoked. She has never used smokeless tobacco.      Counseling Resources:  ATP IV Guidelines  Pooled Cohorts Equation Calculator  Breast Cancer Risk Calculator  BRCA-Related Cancer Risk Assessment: FHS-7 Tool  FRAX Risk Assessment  ICSI Preventive Guidelines  Dietary Guidelines for Americans, 2010  USDA's MyPlate  ASA Prophylaxis  Lung CA Screening    Aleah Spencer, [employer]  "

## 2021-03-17 ENCOUNTER — MYC MEDICAL ADVICE (OUTPATIENT)
Dept: FAMILY MEDICINE | Facility: CLINIC | Age: 21
End: 2021-03-17

## 2021-03-17 DIAGNOSIS — E55.9 VITAMIN D DEFICIENCY: Primary | ICD-10-CM

## 2021-03-17 LAB
ALBUMIN SERPL-MCNC: 3.9 G/DL (ref 3.4–5)
ALP SERPL-CCNC: 159 U/L (ref 40–150)
ALT SERPL W P-5'-P-CCNC: 30 U/L (ref 0–50)
ANION GAP SERPL CALCULATED.3IONS-SCNC: 5 MMOL/L (ref 3–14)
AST SERPL W P-5'-P-CCNC: 16 U/L (ref 0–45)
BILIRUB SERPL-MCNC: 0.3 MG/DL (ref 0.2–1.3)
BUN SERPL-MCNC: 10 MG/DL (ref 7–30)
C TRACH DNA SPEC QL NAA+PROBE: POSITIVE
CALCIUM SERPL-MCNC: 9 MG/DL (ref 8.5–10.1)
CHLORIDE SERPL-SCNC: 106 MMOL/L (ref 94–109)
CHOLEST SERPL-MCNC: 180 MG/DL
CO2 SERPL-SCNC: 27 MMOL/L (ref 20–32)
CREAT SERPL-MCNC: 0.62 MG/DL (ref 0.52–1.04)
DEPRECATED CALCIDIOL+CALCIFEROL SERPL-MC: 5 UG/L (ref 20–75)
GFR SERPL CREATININE-BSD FRML MDRD: >90 ML/MIN/{1.73_M2}
GLUCOSE SERPL-MCNC: 80 MG/DL (ref 70–99)
HDLC SERPL-MCNC: 48 MG/DL
LDLC SERPL CALC-MCNC: 115 MG/DL
N GONORRHOEA DNA SPEC QL NAA+PROBE: NEGATIVE
NONHDLC SERPL-MCNC: 132 MG/DL
POTASSIUM SERPL-SCNC: 4 MMOL/L (ref 3.4–5.3)
PROT SERPL-MCNC: 8.1 G/DL (ref 6.8–8.8)
SODIUM SERPL-SCNC: 138 MMOL/L (ref 133–144)
SPECIMEN SOURCE: ABNORMAL
SPECIMEN SOURCE: NORMAL
TRIGL SERPL-MCNC: 84 MG/DL
TSH SERPL DL<=0.005 MIU/L-ACNC: 2.53 MU/L (ref 0.4–4)

## 2021-03-17 RX ORDER — AZITHROMYCIN 500 MG/1
1000 TABLET, FILM COATED ORAL ONCE
Qty: 2 TABLET | Refills: 1 | Status: SHIPPED | OUTPATIENT
Start: 2021-03-17 | End: 2021-03-17

## 2021-03-17 ASSESSMENT — ANXIETY QUESTIONNAIRES: GAD7 TOTAL SCORE: 0

## 2021-03-17 NOTE — RESULT ENCOUNTER NOTE
Felisha,    Your lab results are as follows:    Labs are normal with exception of those listed below.    Vitamin D low-I am going to send prescription vitamin d to take weekly for 12 weeks, then can switch to 2000 international unit(s) daily.     Cholesterol is slightly elevated. Nothing to worry about-will keep an eye on it.     Chlamydia came back positive-if you know if any exposures let me know and I can send partner treatment if needed. Sometimes this can be asymptomatic for some time. But we should treat.     Alk phos was elevated but nothing else and slight-probably just normal variant.      Please let me know if questions or concerns arise.    Thank you for allowing us at [facility] to assist in your care,     Best Wishes,    Aleah Spencer, CNP

## 2021-03-18 ENCOUNTER — MYC MEDICAL ADVICE (OUTPATIENT)
Dept: FAMILY MEDICINE | Facility: CLINIC | Age: 21
End: 2021-03-18

## 2021-03-18 DIAGNOSIS — E55.9 VITAMIN D DEFICIENCY: ICD-10-CM

## 2021-03-18 LAB
COPATH REPORT: NORMAL
PAP: NORMAL

## 2021-03-18 RX ORDER — ERGOCALCIFEROL 1.25 MG/1
50000 CAPSULE, LIQUID FILLED ORAL WEEKLY
Qty: 12 CAPSULE | Refills: 0 | Status: SHIPPED | OUTPATIENT
Start: 2021-03-18 | End: 2021-06-04

## 2021-03-18 NOTE — TELEPHONE ENCOUNTER
Routing to Aleah Spencer to review mychart and advise to medication pended.    Nehemiah PHILLIPS RN   [facility]

## 2021-03-19 RX ORDER — ERGOCALCIFEROL 1.25 MG/1
CAPSULE, LIQUID FILLED ORAL
Qty: 13 CAPSULE | OUTPATIENT
Start: 2021-03-19

## 2021-03-19 NOTE — TELEPHONE ENCOUNTER
#12 x 0 refill sent 3/18/21 (90 day supply)  No pharmacy change  Rx denied. Pharmacy notified.       Patriiza Dupree RN  [facility]

## 2021-09-19 ENCOUNTER — HEALTH MAINTENANCE LETTER (OUTPATIENT)
Age: 21
End: 2021-09-19

## 2022-04-30 ENCOUNTER — HEALTH MAINTENANCE LETTER (OUTPATIENT)
Age: 22
End: 2022-04-30

## 2022-11-20 ENCOUNTER — HEALTH MAINTENANCE LETTER (OUTPATIENT)
Age: 22
End: 2022-11-20

## 2023-06-02 ENCOUNTER — HEALTH MAINTENANCE LETTER (OUTPATIENT)
Age: 23
End: 2023-06-02